# Patient Record
(demographics unavailable — no encounter records)

---

## 2025-06-10 NOTE — PHYSICAL EXAM
[No Acute Distress] : no acute distress [EOMI] : extraocular movements intact [No Lymphadenopathy] : no lymphadenopathy [Supple] : supple [Clear to Auscultation] : lungs were clear to auscultation bilaterally [Normal S1, S2] : normal S1 and S2 [No Focal Deficits] : no focal deficits [Normal Affect] : the affect was normal [Normal Insight/Judgement] : insight and judgment were intact

## 2025-06-10 NOTE — HISTORY OF PRESENT ILLNESS
[[id number]] : CC: establish care  82yo F with pmh significant for uterine ca, htn, hld, t2dm presents to establish care. Previous patient of Dr Leon. She spends half her time in [place] and half here in her home in [place]. She follows with cards, rad onc, gynonc, and ENT. She denies any acute concerns. She also has an internist in [place].